# Patient Record
Sex: MALE | Race: WHITE | Employment: FULL TIME | URBAN - METROPOLITAN AREA
[De-identification: names, ages, dates, MRNs, and addresses within clinical notes are randomized per-mention and may not be internally consistent; named-entity substitution may affect disease eponyms.]

---

## 2022-07-07 ENCOUNTER — OFFICE VISIT (OUTPATIENT)
Dept: ORTHOPEDIC SURGERY | Age: 62
End: 2022-07-07

## 2022-07-07 VITALS — HEIGHT: 69 IN | BODY MASS INDEX: 42.21 KG/M2 | WEIGHT: 285 LBS

## 2022-07-07 DIAGNOSIS — M17.11 PRIMARY OSTEOARTHRITIS OF RIGHT KNEE: Primary | ICD-10-CM

## 2022-07-07 PROCEDURE — 99203 OFFICE O/P NEW LOW 30 MIN: CPT | Performed by: ORTHOPAEDIC SURGERY

## 2022-07-07 RX ORDER — LATANOPROST 50 UG/ML
1 SOLUTION/ DROPS OPHTHALMIC DAILY
COMMUNITY
Start: 2022-04-21

## 2022-07-07 RX ORDER — INDOMETHACIN 50 MG/1
CAPSULE ORAL
COMMUNITY
Start: 2022-04-29

## 2022-07-07 RX ORDER — METFORMIN HYDROCHLORIDE 500 MG/1
1000 TABLET, EXTENDED RELEASE ORAL
COMMUNITY
Start: 2022-04-15

## 2022-07-07 RX ORDER — BRIMONIDINE TARTRATE/TIMOLOL 0.2%-0.5%
1 DROPS OPHTHALMIC (EYE) EVERY 12 HOURS
COMMUNITY
Start: 2022-04-20

## 2022-07-07 NOTE — PROGRESS NOTES
Chief Complaint:   Chief Complaint   Patient presents with    Knee Pain     Right knee pain. States he hyperextended knee coming off a ladder 3 months ago. No xrays. HPI     Geannie Ahumada is a 64 y.o. male, who presents with approximate 3-month history of persisting and relapsing medial right knee pain, onset after coming down awkwardly off a ladder with possible hyperextension. Denies significant previous problems with this knee no other joint complaints. Gives a history of possible arthroscopy of the left knee in the remote past which is asymptomatic at this time. He has taken some indomethacin with some relief but he is concerned about possible side effects although he is weaning down to 1 or 2 a day. No fever chills sweats or other systemic symptom. Weight is stable chronically obese. Allergies; medications; past medical, surgical, family, and social history; and problem list have been reviewed today and updated as indicated in this encounter - see below following Ortho specifics. Musculoskeletal: Obese otherwise healthy-appearing middle-age male, upper extremities grossly unremarkable leg lengths are equal hip motion is normal bilaterally. Left knee is unremarkable straight and stable with no laxity deformity or effusion through full range of motion. Right knee also well aligned and stable to stress although significant medial joint line tenderness with a positive Jose Alberto's and mild varus deformity in mid flexion. Range of motion is preserved 0 to 120 degrees. Radiologic Studies: Weightbearing x-rays of the knees were obtained today, there is medial narrowing bilaterally which is end-stage on the right with complete loss of medial joint space subchondral sclerosis and early osteophyte formation consistent with end-stage medial compartment DJD right knee. There is native varus tibial slope of 6 to 7 degrees bilaterally.     ASSESSMENT/PLAN:    Shahbaz Ashraf was seen today for knee pain.    Diagnoses and all orders for this visit:    Primary osteoarthritis of right knee  -     XR KNEE BILATERAL STANDING  -     XR KNEE RIGHT (1-2 VIEWS)       Diagnosis and treatment options were reviewed with the patient, currently he is managing this medication so I advised him he could take the indomethacin on an as-needed basis and supplement with Tylenol if needed. We discussed injections which he declined today. I emphasized the imperative for weight loss which would most likely help his knee pain and relieve some of his other conditions such as his type 2 diabetes for which he takes metformin. I also reviewed the eventual possibility of arthroplasty which would hopefully be deferred unless his symptoms are refractory despite appropriate weight loss with a target BMI of 35 or less. Questions asked and answered follow-up as needed. Return if symptoms worsen or fail to improve. Marisa Milner MD    7/7/2022  4:50 PM      There is no problem list on file for this patient. History reviewed. No pertinent past medical history. History reviewed. No pertinent surgical history. Current Outpatient Medications   Medication Sig Dispense Refill    metFORMIN (GLUCOPHAGE-XR) 500 MG extended release tablet Take 1,000 mg by mouth daily (with breakfast)       latanoprost (XALATAN) 0.005 % ophthalmic solution Place 1 drop into the left eye daily       COMBIGAN 0.2-0.5 % ophthalmic solution Place 1 drop into the left eye every 12 hours       indomethacin (INDOCIN) 50 MG capsule  (Patient not taking: Reported on 7/7/2022)       No current facility-administered medications for this visit.        Allergies   Allergen Reactions    No Known Allergies        Social History     Socioeconomic History    Marital status:      Spouse name: None    Number of children: None    Years of education: None    Highest education level: None   Occupational History    None   Tobacco Use    Smoking status: Never Smoker    Smokeless tobacco: Current User     Types: Chew   Substance and Sexual Activity    Alcohol use: None     Comment: rarely    Drug use: Never    Sexual activity: None   Other Topics Concern    None   Social History Narrative    None     Social Determinants of Health     Financial Resource Strain:     Difficulty of Paying Living Expenses: Not on file   Food Insecurity:     Worried About Running Out of Food in the Last Year: Not on file    [place] of Food in the Last Year: Not on file   Transportation Needs:     Lack of Transportation (Medical): Not on file    Lack of Transportation (Non-Medical): Not on file   Physical Activity:     Days of Exercise per Week: Not on file    Minutes of Exercise per Session: Not on file   Stress:     Feeling of Stress : Not on file   Social Connections:     Frequency of Communication with Friends and Family: Not on file    Frequency of Social Gatherings with Friends and Family: Not on file    Attends Holiness Services: Not on file    Active Member of [address] [place] [address] or Organizations: Not on file    Attends Club or Organization Meetings: Not on file    Marital Status: Not on file   Intimate Partner Violence:     Fear of Current or Ex-Partner: Not on file    Emotionally Abused: Not on file    Physically Abused: Not on file    Sexually Abused: Not on file   Housing Stability:     Unable to Pay for Housing in the Last Year: Not on file    Number of [place] in the Last Year: Not on file    Unstable Housing in the Last Year: Not on file       No family history on file. Review of Systems  As follows except as previously noted in HPI:  Constitutional: Negative for chills, diaphoresis, fatigue, fever and unexpected weight change. Respiratory: Negative for cough, shortness of breath and wheezing. Cardiovascular: Negative for chest pain and palpitations. Neurological: Negative for dizziness, syncope, cephalgia.   GI / :